# Patient Record
Sex: FEMALE | Race: ASIAN | Employment: UNEMPLOYED | URBAN - METROPOLITAN AREA
[De-identification: names, ages, dates, MRNs, and addresses within clinical notes are randomized per-mention and may not be internally consistent; named-entity substitution may affect disease eponyms.]

---

## 2017-05-14 ENCOUNTER — APPOINTMENT (OUTPATIENT)
Dept: CT IMAGING | Facility: HOSPITAL | Age: 47
End: 2017-05-14
Attending: EMERGENCY MEDICINE

## 2017-05-14 ENCOUNTER — HOSPITAL ENCOUNTER (EMERGENCY)
Facility: HOSPITAL | Age: 47
Discharge: HOME | End: 2017-05-14
Attending: EMERGENCY MEDICINE

## 2017-05-14 VITALS
SYSTOLIC BLOOD PRESSURE: 114 MMHG | RESPIRATION RATE: 18 BRPM | WEIGHT: 163 LBS | DIASTOLIC BLOOD PRESSURE: 66 MMHG | HEART RATE: 77 BPM | TEMPERATURE: 98 F | BODY MASS INDEX: 32 KG/M2 | HEIGHT: 60 IN | OXYGEN SATURATION: 100 %

## 2017-05-14 DIAGNOSIS — D25.9 UTERINE LEIOMYOMA, UNSPECIFIED LOCATION: ICD-10-CM

## 2017-05-14 DIAGNOSIS — R10.9 ABDOMINAL PAIN OF UNKNOWN ETIOLOGY: Primary | ICD-10-CM

## 2017-05-14 PROCEDURE — 80053 COMPREHEN METABOLIC PANEL: CPT | Performed by: EMERGENCY MEDICINE

## 2017-05-14 PROCEDURE — 87086 URINE CULTURE/COLONY COUNT: CPT | Performed by: EMERGENCY MEDICINE

## 2017-05-14 PROCEDURE — 74177 CT ABD & PELVIS W/CONTRAST: CPT | Performed by: EMERGENCY MEDICINE

## 2017-05-14 PROCEDURE — 96374 THER/PROPH/DIAG INJ IV PUSH: CPT

## 2017-05-14 PROCEDURE — 81001 URINALYSIS AUTO W/SCOPE: CPT | Performed by: EMERGENCY MEDICINE

## 2017-05-14 PROCEDURE — 96361 HYDRATE IV INFUSION ADD-ON: CPT

## 2017-05-14 PROCEDURE — 83690 ASSAY OF LIPASE: CPT | Performed by: EMERGENCY MEDICINE

## 2017-05-14 PROCEDURE — 99284 EMERGENCY DEPT VISIT MOD MDM: CPT

## 2017-05-14 PROCEDURE — 81025 URINE PREGNANCY TEST: CPT

## 2017-05-14 PROCEDURE — 85025 COMPLETE CBC W/AUTO DIFF WBC: CPT | Performed by: EMERGENCY MEDICINE

## 2017-05-14 RX ORDER — SODIUM CHLORIDE 9 MG/ML
INJECTION, SOLUTION INTRAVENOUS ONCE
Status: COMPLETED | OUTPATIENT
Start: 2017-05-14 | End: 2017-05-14

## 2017-05-14 RX ORDER — MORPHINE SULFATE 2 MG/ML
2 INJECTION, SOLUTION INTRAMUSCULAR; INTRAVENOUS ONCE
Status: COMPLETED | OUTPATIENT
Start: 2017-05-14 | End: 2017-05-14

## 2017-05-15 NOTE — ED PROVIDER NOTES
Patient Seen in: [facility] Emergency Department    History   Patient presents with:  Abdomen/Flank Pain (GI/)  Back Pain (musculoskeletal)    Stated Complaint: abd and back pain    HPI    51-year-old female here for right flank pain for the last oriented to person, place, and time. No cranial nerve deficit. Skin: Skin is warm and dry. Psychiatric: Normal mood and affect.  Thought content normal.       ED Course     Labs Reviewed   URINALYSIS WITH CULTURE REFLEX - Abnormal; Notable for the follo leiomyoma, unspecified location    Disposition:  Discharge    Follow-up:  Scott Canales MD  [facility]  [phone]    In 3 days        Medications Prescribed:  There are no discharge medications for this patient.

## 2018-03-17 ENCOUNTER — HOSPITAL ENCOUNTER (EMERGENCY)
Facility: HOSPITAL | Age: 48
Discharge: HOME | End: 2018-03-17
Attending: EMERGENCY MEDICINE
Payer: COMMERCIAL

## 2018-03-17 ENCOUNTER — APPOINTMENT (OUTPATIENT)
Dept: ULTRASOUND IMAGING | Facility: HOSPITAL | Age: 48
End: 2018-03-17
Attending: EMERGENCY MEDICINE
Payer: COMMERCIAL

## 2018-03-17 VITALS
HEART RATE: 74 BPM | TEMPERATURE: 98 F | OXYGEN SATURATION: 100 % | DIASTOLIC BLOOD PRESSURE: 70 MMHG | HEIGHT: 64 IN | WEIGHT: 135 LBS | SYSTOLIC BLOOD PRESSURE: 113 MMHG | RESPIRATION RATE: 16 BRPM | BODY MASS INDEX: 23.05 KG/M2

## 2018-03-17 DIAGNOSIS — D25.9 UTERINE LEIOMYOMA, UNSPECIFIED LOCATION: ICD-10-CM

## 2018-03-17 DIAGNOSIS — N92.0 MENORRHAGIA WITH REGULAR CYCLE: Primary | ICD-10-CM

## 2018-03-17 LAB
ALBUMIN SERPL-MCNC: 3.6 G/DL (ref 3.5–4.8)
ALP LIVER SERPL-CCNC: 97 U/L (ref 39–100)
ALT SERPL-CCNC: 13 U/L (ref 14–54)
AST SERPL-CCNC: 14 U/L (ref 15–41)
BASOPHILS # BLD AUTO: 0.07 X10(3) UL (ref 0–0.1)
BASOPHILS NFR BLD AUTO: 1.1 %
BILIRUB SERPL-MCNC: 0.5 MG/DL (ref 0.1–2)
BILIRUB UR QL STRIP.AUTO: NEGATIVE
BUN BLD-MCNC: 8 MG/DL (ref 8–20)
CALCIUM BLD-MCNC: 8.9 MG/DL (ref 8.3–10.3)
CHLORIDE: 110 MMOL/L (ref 101–111)
CO2: 25 MMOL/L (ref 22–32)
CREAT BLD-MCNC: 0.69 MG/DL (ref 0.55–1.02)
EOSINOPHIL # BLD AUTO: 0.24 X10(3) UL (ref 0–0.3)
EOSINOPHIL NFR BLD AUTO: 3.8 %
ERYTHROCYTE [DISTWIDTH] IN BLOOD BY AUTOMATED COUNT: 13.8 % (ref 11.5–16)
GLUCOSE BLD-MCNC: 105 MG/DL (ref 70–99)
GLUCOSE UR STRIP.AUTO-MCNC: NEGATIVE MG/DL
HCT VFR BLD AUTO: 37.5 % (ref 34–50)
HGB BLD-MCNC: 11.7 G/DL (ref 12–16)
IMMATURE GRANULOCYTE COUNT: 0.02 X10(3) UL (ref 0–1)
IMMATURE GRANULOCYTE RATIO %: 0.3 %
LYMPHOCYTES # BLD AUTO: 1.85 X10(3) UL (ref 0.9–4)
LYMPHOCYTES NFR BLD AUTO: 29.1 %
M PROTEIN MFR SERPL ELPH: 7.7 G/DL (ref 6.1–8.3)
MCH RBC QN AUTO: 25.3 PG (ref 27–33.2)
MCHC RBC AUTO-ENTMCNC: 31.2 G/DL (ref 31–37)
MCV RBC AUTO: 81.2 FL (ref 81–100)
MONOCYTES # BLD AUTO: 0.43 X10(3) UL (ref 0.1–1)
MONOCYTES NFR BLD AUTO: 6.8 %
NEUTROPHIL ABS PRELIM: 3.75 X10 (3) UL (ref 1.3–6.7)
NEUTROPHILS # BLD AUTO: 3.75 X10(3) UL (ref 1.3–6.7)
NEUTROPHILS NFR BLD AUTO: 58.9 %
NITRITE UR QL STRIP.AUTO: NEGATIVE
PH UR STRIP.AUTO: 6 [PH] (ref 4.5–8)
PLATELET # BLD AUTO: 307 10(3)UL (ref 150–450)
POCT LOT NUMBER: NORMAL
POCT URINE PREGNANCY: NEGATIVE
POTASSIUM SERPL-SCNC: 3.7 MMOL/L (ref 3.6–5.1)
PROCEDURE CONTROL: YES
PROT UR STRIP.AUTO-MCNC: 100 MG/DL
RBC # BLD AUTO: 4.62 X10(6)UL (ref 3.8–5.1)
RBC #/AREA URNS AUTO: >10 /HPF
RED CELL DISTRIBUTION WIDTH-SD: 40.2 FL (ref 35.1–46.3)
SODIUM SERPL-SCNC: 141 MMOL/L (ref 136–144)
SP GR UR STRIP.AUTO: 1.01 (ref 1–1.03)
TSI SER-ACNC: 4.36 MIU/ML (ref 0.35–5.5)
UROBILINOGEN UR STRIP.AUTO-MCNC: <2 MG/DL
WBC # BLD AUTO: 6.4 X10(3) UL (ref 4–13)
WBC #/AREA URNS AUTO: >50 /HPF
YEAST URINE: PRESENT

## 2018-03-17 PROCEDURE — 81001 URINALYSIS AUTO W/SCOPE: CPT | Performed by: EMERGENCY MEDICINE

## 2018-03-17 PROCEDURE — 85025 COMPLETE CBC W/AUTO DIFF WBC: CPT

## 2018-03-17 PROCEDURE — 80053 COMPREHEN METABOLIC PANEL: CPT | Performed by: EMERGENCY MEDICINE

## 2018-03-17 PROCEDURE — 76856 US EXAM PELVIC COMPLETE: CPT | Performed by: EMERGENCY MEDICINE

## 2018-03-17 PROCEDURE — 80050 GENERAL HEALTH PANEL: CPT | Performed by: EMERGENCY MEDICINE

## 2018-03-17 PROCEDURE — 87086 URINE CULTURE/COLONY COUNT: CPT | Performed by: EMERGENCY MEDICINE

## 2018-03-17 PROCEDURE — 81025 URINE PREGNANCY TEST: CPT

## 2018-03-17 PROCEDURE — 96374 THER/PROPH/DIAG INJ IV PUSH: CPT

## 2018-03-17 PROCEDURE — 99285 EMERGENCY DEPT VISIT HI MDM: CPT

## 2018-03-17 PROCEDURE — 80053 COMPREHEN METABOLIC PANEL: CPT

## 2018-03-17 PROCEDURE — 93975 VASCULAR STUDY: CPT | Performed by: EMERGENCY MEDICINE

## 2018-03-17 PROCEDURE — 85025 COMPLETE CBC W/AUTO DIFF WBC: CPT | Performed by: EMERGENCY MEDICINE

## 2018-03-17 PROCEDURE — 99284 EMERGENCY DEPT VISIT MOD MDM: CPT

## 2018-03-17 PROCEDURE — 84443 ASSAY THYROID STIM HORMONE: CPT | Performed by: EMERGENCY MEDICINE

## 2018-03-17 PROCEDURE — 76830 TRANSVAGINAL US NON-OB: CPT | Performed by: EMERGENCY MEDICINE

## 2018-03-17 RX ORDER — ACETAMINOPHEN AND CODEINE PHOSPHATE 300; 30 MG/1; MG/1
1-2 TABLET ORAL EVERY 4 HOURS PRN
Qty: 10 TABLET | Refills: 0 | Status: SHIPPED | OUTPATIENT
Start: 2018-03-17 | End: 2018-03-24

## 2018-03-17 RX ORDER — KETOROLAC TROMETHAMINE 30 MG/ML
30 INJECTION, SOLUTION INTRAMUSCULAR; INTRAVENOUS ONCE
Status: COMPLETED | OUTPATIENT
Start: 2018-03-17 | End: 2018-03-17

## 2018-03-17 NOTE — ED INITIAL ASSESSMENT (HPI)
Pt states last period was in sept it was normal, this is first time having a period since than, this is second day and bad cramping and heavy bleeding, pt has not followed up with a gyne

## 2018-03-17 NOTE — ED PROVIDER NOTES
Patient Seen in: [facility] Emergency Department    History   Patient presents with:  Eval-G (gynecologic)    Stated Complaint: Eval - G, abd cramping    Lower abdominal cramping and painful periods            Patient presents to the emergency depar well-nourished. No distress. HENT:   Head: Atraumatic.    Right Ear: External ear normal.   Left Ear: External ear normal.   Nose: Nose normal.   Mouth/Throat: Oropharynx is clear and moist.   Eyes: Conjunctivae and EOM are normal. Pupils are equal, round CULTURE REFLEX   RAINBOW DRAW BLUE   RAINBOW DRAW LAVENDER   RAINBOW DRAW LIGHT GREEN   RAINBOW DRAW GOLD       ED Course as of Mar 17 1320  ------------------------------------------------------------  US PELVIS EV W DOPPLER (FAY=[id number])+(VUI [id number]) DOPPLER WAVE FORMS    FLOW:  There is normal arterial and venous Doppler wave forms in both     ovaries. The spectral analysis is within normal limits.     OTHER:  Negative.         =====    CONCLUSION:  There are 2 dominant fibroids noted within the u location    Disposition:  Discharge  3/17/2018  1:20 pm    Follow-up:  Marilin Lazar, [address] [place] [address]  [phone]    Schedule an appointment as soon as possible for a visit          Medications Prescribed:  Current

## 2018-03-18 RX ORDER — FLUCONAZOLE 150 MG/1
150 TABLET ORAL DAILY
Qty: 2 TABLET | Refills: 0 | Status: SHIPPED | OUTPATIENT
Start: 2018-03-18 | End: 2018-03-22 | Stop reason: ALTCHOICE

## 2018-03-18 RX ORDER — CEPHALEXIN 500 MG/1
500 CAPSULE ORAL 3 TIMES DAILY
Qty: 15 CAPSULE | Refills: 0 | Status: SHIPPED | OUTPATIENT
Start: 2018-03-18 | End: 2018-03-22 | Stop reason: ALTCHOICE

## 2018-03-18 NOTE — ED NOTES
Per Dr Shama Wilson, pt son Ayden Roberson called at [phone] to inform that 2 RXs have been written for pt's Mom. Ayden Roberson requests to call in RXs to [facility] ([phone]). Number provided by Ayden Roberson.  Prescriptions were called in by this RN

## 2018-03-20 ENCOUNTER — OFFICE VISIT (OUTPATIENT)
Dept: OBGYN CLINIC | Facility: CLINIC | Age: 48
End: 2018-03-20

## 2018-03-20 VITALS
HEART RATE: 88 BPM | BODY MASS INDEX: 28.86 KG/M2 | DIASTOLIC BLOOD PRESSURE: 60 MMHG | HEIGHT: 60 IN | WEIGHT: 147 LBS | SYSTOLIC BLOOD PRESSURE: 130 MMHG | RESPIRATION RATE: 16 BRPM

## 2018-03-20 DIAGNOSIS — Z12.39 BREAST CANCER SCREENING: ICD-10-CM

## 2018-03-20 DIAGNOSIS — N92.1 MENOMETRORRHAGIA: ICD-10-CM

## 2018-03-20 DIAGNOSIS — Z01.419 ENCOUNTER FOR WELL WOMAN EXAM WITH ROUTINE GYNECOLOGICAL EXAM: Primary | ICD-10-CM

## 2018-03-20 DIAGNOSIS — Z12.4 CERVICAL CANCER SCREENING: ICD-10-CM

## 2018-03-20 PROCEDURE — 99386 PREV VISIT NEW AGE 40-64: CPT | Performed by: OBSTETRICS & GYNECOLOGY

## 2018-03-20 PROCEDURE — 99203 OFFICE O/P NEW LOW 30 MIN: CPT | Performed by: OBSTETRICS & GYNECOLOGY

## 2018-03-20 PROCEDURE — 88175 CYTOPATH C/V AUTO FLUID REDO: CPT | Performed by: OBSTETRICS & GYNECOLOGY

## 2018-03-20 PROCEDURE — 87624 HPV HI-RISK TYP POOLED RSLT: CPT | Performed by: OBSTETRICS & GYNECOLOGY

## 2018-03-21 ENCOUNTER — TELEPHONE (OUTPATIENT)
Dept: OBGYN CLINIC | Facility: CLINIC | Age: 48
End: 2018-03-21

## 2018-03-21 ENCOUNTER — HOSPITAL ENCOUNTER (OUTPATIENT)
Dept: MAMMOGRAPHY | Facility: HOSPITAL | Age: 48
Discharge: HOME | End: 2018-03-21
Attending: OBSTETRICS & GYNECOLOGY
Payer: COMMERCIAL

## 2018-03-21 DIAGNOSIS — Z12.39 BREAST CANCER SCREENING: ICD-10-CM

## 2018-03-21 LAB — HPV I/H RISK 1 DNA SPEC QL NAA+PROBE: NEGATIVE

## 2018-03-21 PROCEDURE — 77067 SCR MAMMO BI INCL CAD: CPT | Performed by: OBSTETRICS & GYNECOLOGY

## 2018-03-21 PROCEDURE — 77063 BREAST TOMOSYNTHESIS BI: CPT | Performed by: OBSTETRICS & GYNECOLOGY

## 2018-03-21 NOTE — TELEPHONE ENCOUNTER
Per pt she has some questions about the options that were given by dr SAPP. Please call and advise. Her son is in the hipaa list, you can talk to him.  Thanks

## 2018-03-21 NOTE — TELEPHONE ENCOUNTER
Patient son called to inform office that his mom would like to proceed with a Novasure ablation. Will call patient back tomorrow with available surgery dates.

## 2018-03-22 ENCOUNTER — TELEPHONE (OUTPATIENT)
Dept: OBGYN CLINIC | Facility: CLINIC | Age: 48
End: 2018-03-22

## 2018-03-22 NOTE — TELEPHONE ENCOUNTER
Via webportal for patient's insurance - [employer] - determined prior Niranjan Pour is not required for upcoming procedure 12229 hysteroscopy/D&C/endometrial ablation.

## 2018-03-28 NOTE — PROGRESS NOTES
Called and spoke with patients son, ok per hippa, and informed of normal pap results. Pts son expressed understanding.

## 2018-04-03 ENCOUNTER — ANESTHESIA (OUTPATIENT)
Dept: SURGERY | Facility: HOSPITAL | Age: 48
End: 2018-04-03
Payer: COMMERCIAL

## 2018-04-03 ENCOUNTER — SURGERY (OUTPATIENT)
Age: 48
End: 2018-04-03

## 2018-04-03 ENCOUNTER — ANESTHESIA EVENT (OUTPATIENT)
Dept: SURGERY | Facility: HOSPITAL | Age: 48
End: 2018-04-03
Payer: COMMERCIAL

## 2018-04-03 ENCOUNTER — HOSPITAL ENCOUNTER (OUTPATIENT)
Facility: HOSPITAL | Age: 48
Setting detail: HOSPITAL OUTPATIENT SURGERY
Discharge: HOME | End: 2018-04-03
Attending: OBSTETRICS & GYNECOLOGY | Admitting: OBSTETRICS & GYNECOLOGY
Payer: COMMERCIAL

## 2018-04-03 VITALS
TEMPERATURE: 99 F | BODY MASS INDEX: 28.66 KG/M2 | HEART RATE: 64 BPM | OXYGEN SATURATION: 100 % | WEIGHT: 146 LBS | SYSTOLIC BLOOD PRESSURE: 113 MMHG | HEIGHT: 60 IN | RESPIRATION RATE: 16 BRPM | DIASTOLIC BLOOD PRESSURE: 84 MMHG

## 2018-04-03 PROCEDURE — 0UDB8ZZ EXTRACTION OF ENDOMETRIUM, VIA NATURAL OR ARTIFICIAL OPENING ENDOSCOPIC: ICD-10-PCS | Performed by: OBSTETRICS & GYNECOLOGY

## 2018-04-03 PROCEDURE — 58563 HYSTEROSCOPY ABLATION: CPT | Performed by: OBSTETRICS & GYNECOLOGY

## 2018-04-03 PROCEDURE — 0U5B8ZZ DESTRUCTION OF ENDOMETRIUM, VIA NATURAL OR ARTIFICIAL OPENING ENDOSCOPIC: ICD-10-PCS | Performed by: OBSTETRICS & GYNECOLOGY

## 2018-04-03 RX ORDER — ONDANSETRON 2 MG/ML
4 INJECTION INTRAMUSCULAR; INTRAVENOUS AS NEEDED
Status: DISCONTINUED | OUTPATIENT
Start: 2018-04-03 | End: 2018-04-03

## 2018-04-03 RX ORDER — HYDROCODONE BITARTRATE AND ACETAMINOPHEN 5; 325 MG/1; MG/1
1 TABLET ORAL AS NEEDED
Status: DISCONTINUED | OUTPATIENT
Start: 2018-04-03 | End: 2018-04-03

## 2018-04-03 RX ORDER — NALOXONE HYDROCHLORIDE 0.4 MG/ML
80 INJECTION, SOLUTION INTRAMUSCULAR; INTRAVENOUS; SUBCUTANEOUS AS NEEDED
Status: DISCONTINUED | OUTPATIENT
Start: 2018-04-03 | End: 2018-04-03

## 2018-04-03 RX ORDER — SODIUM CHLORIDE, SODIUM LACTATE, POTASSIUM CHLORIDE, CALCIUM CHLORIDE 600; 310; 30; 20 MG/100ML; MG/100ML; MG/100ML; MG/100ML
INJECTION, SOLUTION INTRAVENOUS CONTINUOUS
Status: DISCONTINUED | OUTPATIENT
Start: 2018-04-03 | End: 2018-04-03

## 2018-04-03 RX ORDER — HYDROCODONE BITARTRATE AND ACETAMINOPHEN 5; 325 MG/1; MG/1
1 TABLET ORAL EVERY 6 HOURS PRN
Qty: 5 TABLET | Refills: 0 | Status: SHIPPED | OUTPATIENT
Start: 2018-04-03

## 2018-04-03 RX ORDER — HYDROCODONE BITARTRATE AND ACETAMINOPHEN 5; 325 MG/1; MG/1
2 TABLET ORAL AS NEEDED
Status: DISCONTINUED | OUTPATIENT
Start: 2018-04-03 | End: 2018-04-03

## 2018-04-03 NOTE — ANESTHESIA PREPROCEDURE EVALUATION
PRE-OP EVALUATION    Patient Name: [facility] AND [facility]    Pre-op Diagnosis: PELVIC PAIN, HEAVY BLEEDING     Procedure(s):   HYSTEROSCOPY/DILATION AND CURETTAGE, ENDOMETRIAL ABLATION    Surgeon(s) and Role:     * Promise Petit DO - Primary    Pre-op vi Cardiovascular      Rhythm: regular  Rate: normal     Dental    No notable dental history. Pulmonary      Breath sounds clear to auscultation bilaterally.                Other findings            ASA: 1   Plan: MAC  NPO status verified and patient m

## 2018-04-03 NOTE — OPERATIVE REPORT
Pre-op Dx: Menometrorrhagia  Post-op Dx: same  Surgeon:   Procedure: Hysteroscopy D&C, novasure ablation  Complications: none  Findings: 10 cm uterus, 5 cm - length , 4.2 cm width, no intrauterine lesions  Procedure note: Pt was taken to the O. R

## 2018-04-03 NOTE — ANESTHESIA POSTPROCEDURE EVALUATION
[address] [facility] Patient Status:  Hospital Outpatient Surgery   Age/Gender 52year old female MRN [id number]   [facility] SURGERY Attending Sarai Fang, [address] [facility] Day # 0 PCP Glenn Enrique MD       Anesthesia Post-o

## 2018-04-03 NOTE — H&P
Guille Nam is a 52year old female [id number] Patient's last menstrual period was 03/15/2018 (approximate). No chief complaint on file. .  Patient c/o very painful, long heavy menses, desires novasure ablation.     OBSTETRICS HISTORY:  OB History Neurological:  denies headaches, extremity weakness or numbness. Psychiatric: denies depression or anxiety. Endocrine:   denies excessive thirst or urination. Heme/Lymph:  denies history of anemia, easy bruising or bleeding.       PHYSICAL EXAM:   Cons

## 2019-04-18 ENCOUNTER — TELEPHONE (OUTPATIENT)
Dept: OBGYN CLINIC | Facility: CLINIC | Age: 49
End: 2019-04-18

## 2019-04-18 NOTE — TELEPHONE ENCOUNTER
Per pt request .records was faxed to [address] @ [phone].A # [phone]. [address]  [facility].[address] [place] [address].

## 2019-07-22 ENCOUNTER — TELEPHONE (OUTPATIENT)
Dept: OBGYN CLINIC | Facility: CLINIC | Age: 49
End: 2019-07-22

## 2019-07-22 DIAGNOSIS — Z12.31 ENCOUNTER FOR SCREENING MAMMOGRAM FOR BREAST CANCER: Primary | ICD-10-CM

## 2019-07-22 NOTE — TELEPHONE ENCOUNTER
PT would like order for a mammogram. Leaving the state in two weeks and needs before.   Please advise

## 2022-02-11 NOTE — PROGRESS NOTES
Tamia Gamble is a 52year old female [id number] Patient's last menstrual period was 03/15/2018 (approximate). Patient presents with:  Er F/u: new pt.  seen in er 03/17/2018. abd. pain. discuss recent US results.   .  Patient had period in September [address]  Cardiovascular:  denies chest pain or palpitations  Respiratory:  denies shortness of breath  Gastrointestinal:  denies heartburn, abdominal pain, diarrhea or constipation  Genitourinary:  denies dysuria, incontinence, abnormal vaginal disch answered, patients daughter present and translate.    Novasure and Mirena IUD info given, patient will consider and call us back    Assessment & Plan:  Diagnoses and all orders for this visit:    Encounter for well woman exam with routine gynecological exam no vomiting.

## 2024-08-29 ENCOUNTER — HOSPITAL ENCOUNTER (OUTPATIENT)
Age: 54
Discharge: HOME | End: 2024-08-29
Payer: MEDICAID

## 2024-08-29 VITALS
WEIGHT: 162.5 LBS | HEIGHT: 60 IN | SYSTOLIC BLOOD PRESSURE: 131 MMHG | OXYGEN SATURATION: 100 % | HEART RATE: 78 BPM | DIASTOLIC BLOOD PRESSURE: 68 MMHG | RESPIRATION RATE: 18 BRPM | TEMPERATURE: 98 F | BODY MASS INDEX: 31.9 KG/M2

## 2024-08-29 DIAGNOSIS — J02.8 VIRAL SORE THROAT: Primary | ICD-10-CM

## 2024-08-29 DIAGNOSIS — B97.89 VIRAL SORE THROAT: Primary | ICD-10-CM

## 2024-08-29 LAB
S PYO AG THROAT QL IA.RAPID: NEGATIVE
SARS-COV-2 RNA RESP QL NAA+PROBE: NOT DETECTED

## 2024-08-29 PROCEDURE — 99204 OFFICE O/P NEW MOD 45 MIN: CPT

## 2024-08-29 PROCEDURE — 87651 STREP A DNA AMP PROBE: CPT | Performed by: NURSE PRACTITIONER

## 2024-08-29 PROCEDURE — 99203 OFFICE O/P NEW LOW 30 MIN: CPT

## 2024-08-29 RX ORDER — DEXAMETHASONE SODIUM PHOSPHATE 10 MG/ML
10 INJECTION, SOLUTION INTRAMUSCULAR; INTRAVENOUS ONCE
Status: COMPLETED | OUTPATIENT
Start: 2024-08-29 | End: 2024-08-29

## 2024-08-29 NOTE — DISCHARGE INSTRUCTIONS
Warm salt water gargles and throat lozenges.  Tylenol and ibuprofen.  Follow-up with your PCP as needed.

## 2024-08-29 NOTE — ED PROVIDER NOTES
Patient Seen in: [facility]      History     Chief Complaint   Patient presents with    Sore Throat            Stated Complaint: Throat issue    Subjective:   This is a 53-year-old female with below stated medical history.  Presents to immediate care for sore throat and headache.  Symptoms ongoing for the last 2 days.  She denies any nasal congestion, rhinorrhea, or coughing.  No voice change or stridor.  Taking Tylenol with some relief.     The history is provided by the patient and the spouse.           Objective:   Past Medical History:    H/O screening mammography    Negative/[facility] PHARMA    Visual impairment    GLASSES              Past Surgical History:   Procedure Laterality Date    Hysteroscopy      Needle biopsy right  03/2015    US core bx -benign                Social History     Socioeconomic History    Marital status:    Tobacco Use    Smoking status: Never    Smokeless tobacco: Never   Substance and Sexual Activity    Alcohol use: No    Drug use: No     Social Determinants of Health      Received from [facility], [facility]    [facility] [address] [facility]              Review of Systems   Constitutional:  Negative for chills and fever.   HENT:  Positive for sore throat. Negative for congestion.    Respiratory:  Negative for cough.    Cardiovascular:  Negative for chest pain, palpitations and leg swelling.   Gastrointestinal:  Negative for abdominal pain.   Genitourinary:  Negative for dysuria.   Musculoskeletal:  Negative for back pain, neck pain and neck stiffness.   Skin:  Negative for rash.   Neurological:  Positive for headaches.   Hematological:  Does not bruise/bleed easily.       Positive for stated Chief Complaint: Sore Throat (/)    Other systems are as noted in HPI.  Constitutional and vital signs reviewed.      All other systems reviewed and negative except as noted above.    Physical Exam     ED Triage Vitals [08/29/24 1357]   /68   Pulse 78   Resp 18   Temp 98.3 °F (36.8  °C)   Temp src Temporal   SpO2 100 %   O2 Device None (Room air)       Current Vitals:   Vital Signs  BP: 131/68  Pulse: 78  Resp: 18  Temp: 98.3 °F (36.8 °C)  Temp src: Temporal    Oxygen Therapy  SpO2: 100 %  O2 Device: None (Room air)            Physical Exam  Vitals and nursing note reviewed.   Constitutional:       General: She is not in acute distress.     Appearance: Normal appearance. She is well-developed. She is obese. She is not ill-appearing, toxic-appearing or diaphoretic.   HENT:      Head: Normocephalic and atraumatic.      Right Ear: Tympanic membrane, ear canal and external ear normal. No drainage, swelling or tenderness. No middle ear effusion. Tympanic membrane is not erythematous.      Left Ear: Tympanic membrane, ear canal and external ear normal. No drainage, swelling or tenderness.  No middle ear effusion. Tympanic membrane is not erythematous.      Nose: Nose normal. No congestion or rhinorrhea.      Mouth/Throat:      Mouth: Mucous membranes are moist. No oral lesions.      Pharynx: Oropharynx is clear. Uvula midline. No pharyngeal swelling, oropharyngeal exudate, posterior oropharyngeal erythema or uvula swelling.      Tonsils: No tonsillar exudate or tonsillar abscesses. 1+ on the right. 1+ on the left.   Eyes:      General:         Right eye: No discharge.         Left eye: No discharge.      Extraocular Movements: Extraocular movements intact.      Conjunctiva/sclera: Conjunctivae normal.   Cardiovascular:      Rate and Rhythm: Normal rate and regular rhythm.      Heart sounds: Normal heart sounds. No murmur heard.  Pulmonary:      Effort: Pulmonary effort is normal. No respiratory distress.      Breath sounds: Normal breath sounds. No stridor. No wheezing, rhonchi or rales.   Musculoskeletal:      Cervical back: Neck supple.      Right lower leg: No edema.      Left lower leg: No edema.   Skin:     General: Skin is warm and dry.      Capillary Refill: Capillary refill takes less than 2  seconds.      Findings: No rash.   Neurological:      Mental Status: She is alert and oriented to person, place, and time.   Psychiatric:         Mood and Affect: Mood normal.         Behavior: Behavior normal.               ED Course     Labs Reviewed   RAPID STREP A - Normal   RAPID SARS-COV-2 BY PCR - Normal             Rapid strep, rapid COVID         MDM        Vital signs stable.  Patient is well-appearing and nontoxic looking.  Presents to immediate care for localized sore throat and headache.    Differential diagnosis include but is not limited to strep, viral pharyngitis, COVID, tonsillitis, less likely PTA    Posterior pharynx is erythemic with bilateral tonsillar swelling +1.  Tonsils stones bilaterally.  No evidence of exudates or PTA.  Rapid strep and rapid COVID are negative.    Clinical impression is viral pharyngitis    1 dose of Decadron given here in the clinic.  DC home.  Symptomatic and supportive care discussed.  Recommended PCP follow-up as needed.  Patient and her  verbalized understanding, and agreed with plan of care.  All questions were answered.                                       Medical Decision Making      Disposition and Plan     Clinical Impression:  1. Viral sore throat         Disposition:  Discharge  8/29/2024  2:30 pm    Follow-up:  Roxanna Saldana MD  [address] [address] [phone]    In 1 week            Medications Prescribed:  Current Discharge Medication List

## 2024-08-29 NOTE — ED INITIAL ASSESSMENT (HPI)
Sore throat for past two days, lowe grade fever. Denies coughing, congestion, or runny nose. Had a headache last night.

## 2024-09-28 ENCOUNTER — APPOINTMENT (OUTPATIENT)
Dept: GENERAL RADIOLOGY | Age: 54
End: 2024-09-28
Attending: EMERGENCY MEDICINE
Payer: MEDICAID

## 2024-09-28 ENCOUNTER — HOSPITAL ENCOUNTER (OUTPATIENT)
Age: 54
Discharge: HOME | End: 2024-09-28
Attending: EMERGENCY MEDICINE
Payer: MEDICAID

## 2024-09-28 VITALS
OXYGEN SATURATION: 100 % | TEMPERATURE: 100 F | WEIGHT: 160.94 LBS | HEART RATE: 89 BPM | SYSTOLIC BLOOD PRESSURE: 112 MMHG | RESPIRATION RATE: 16 BRPM | HEIGHT: 60 IN | DIASTOLIC BLOOD PRESSURE: 71 MMHG | BODY MASS INDEX: 31.6 KG/M2

## 2024-09-28 DIAGNOSIS — R50.9 FEVER, UNSPECIFIED FEVER CAUSE: Primary | ICD-10-CM

## 2024-09-28 DIAGNOSIS — N30.00 ACUTE CYSTITIS WITHOUT HEMATURIA: ICD-10-CM

## 2024-09-28 LAB
#MXD IC: 0.8 X10ˆ3/UL (ref 0.1–1)
BILIRUB UR QL STRIP: NEGATIVE
BUN BLD-MCNC: 8 MG/DL (ref 7–18)
CHLORIDE BLD-SCNC: 104 MMOL/L (ref 98–112)
CLARITY UR: CLEAR
CO2 BLD-SCNC: 26 MMOL/L (ref 21–32)
COLOR UR: YELLOW
CREAT BLD-MCNC: 0.7 MG/DL
EGFRCR SERPLBLD CKD-EPI 2021: 103 ML/MIN/1.73M2 (ref 60–?)
GLUCOSE BLD-MCNC: 103 MG/DL (ref 70–99)
GLUCOSE UR STRIP-MCNC: NEGATIVE MG/DL
HCT VFR BLD AUTO: 34.8 %
HCT VFR BLD CALC: 33 %
HGB BLD-MCNC: 10.4 G/DL
ISTAT IONIZED CALCIUM FOR CHEM 8: 1.22 MMOL/L (ref 1.12–1.32)
KETONES UR STRIP-MCNC: NEGATIVE MG/DL
LYMPHOCYTES # BLD AUTO: 2.7 X10ˆ3/UL (ref 1–4)
LYMPHOCYTES NFR BLD AUTO: 35.4 %
MCH RBC QN AUTO: 23.6 PG (ref 26–34)
MCHC RBC AUTO-ENTMCNC: 29.9 G/DL (ref 31–37)
MCV RBC AUTO: 79.1 FL (ref 80–100)
MIXED CELL %: 10.3 %
NEUTROPHILS # BLD AUTO: 4 X10ˆ3/UL (ref 1.5–7.7)
NEUTROPHILS NFR BLD AUTO: 54.3 %
NITRITE UR QL STRIP: NEGATIVE
PH UR STRIP: 6.5 [PH]
PLATELET # BLD AUTO: 350 X10ˆ3/UL (ref 150–450)
POCT INFLUENZA A: NEGATIVE
POCT INFLUENZA B: NEGATIVE
POTASSIUM BLD-SCNC: 4 MMOL/L (ref 3.6–5.1)
PROT UR STRIP-MCNC: NEGATIVE MG/DL
RBC # BLD AUTO: 4.4 X10ˆ6/UL
S PYO AG THROAT QL IA.RAPID: NEGATIVE
SARS-COV-2 RNA RESP QL NAA+PROBE: NOT DETECTED
SODIUM BLD-SCNC: 140 MMOL/L (ref 136–145)
SP GR UR STRIP: 1.02
UROBILINOGEN UR STRIP-ACNC: <2 MG/DL
WBC # BLD AUTO: 7.5 X10ˆ3/UL (ref 4–11)

## 2024-09-28 PROCEDURE — 87502 INFLUENZA DNA AMP PROBE: CPT | Performed by: EMERGENCY MEDICINE

## 2024-09-28 PROCEDURE — 80047 BASIC METABLC PNL IONIZED CA: CPT

## 2024-09-28 PROCEDURE — 99214 OFFICE O/P EST MOD 30 MIN: CPT

## 2024-09-28 PROCEDURE — 81002 URINALYSIS NONAUTO W/O SCOPE: CPT

## 2024-09-28 PROCEDURE — 87086 URINE CULTURE/COLONY COUNT: CPT | Performed by: EMERGENCY MEDICINE

## 2024-09-28 PROCEDURE — 36415 COLL VENOUS BLD VENIPUNCTURE: CPT

## 2024-09-28 PROCEDURE — 87651 STREP A DNA AMP PROBE: CPT | Performed by: EMERGENCY MEDICINE

## 2024-09-28 PROCEDURE — 71046 X-RAY EXAM CHEST 2 VIEWS: CPT | Performed by: EMERGENCY MEDICINE

## 2024-09-28 PROCEDURE — 85025 COMPLETE CBC W/AUTO DIFF WBC: CPT | Performed by: EMERGENCY MEDICINE

## 2024-09-28 RX ORDER — CEFADROXIL 500 MG/1
500 CAPSULE ORAL 2 TIMES DAILY
Qty: 20 CAPSULE | Refills: 0 | Status: SHIPPED | OUTPATIENT
Start: 2024-09-28 | End: 2024-10-08

## 2024-09-28 NOTE — DISCHARGE INSTRUCTIONS
Take your temperature daily in the evening hours and keep a diary of the values for your primary care physician.

## 2024-09-28 NOTE — ED PROVIDER NOTES
Patient Seen in: [facility]      History     Chief Complaint   Patient presents with    Sore Throat     Stated Complaint: sore throat/fever    Subjective:   HPI    53-year-old female comes to the immediate care with her daughter for evaluation of fevers for a month.  The patient was seen in the immediate care 4 weeks ago with a fever and a sore throat.  Strep and COVID were negative at that time.  She followed with with her primary care physician the following week and was treated with antibiotics because she was continuing to complain of throat pain.  Since that time her daughter states that she has had fevers every night although no one is taking her temperature she has been using ibuprofen.  She has had some bodyaches.  No headache, ear or throat pain, cough or difficulty breathing, vomiting or diarrhea.  She has not sought additional follow-up with her primary care physician since the initial visit more than 3 weeks ago.    Objective:   Past Medical History:    H/O screening mammography    Negative/[employer]    Visual impairment    GLASSES              Past Surgical History:   Procedure Laterality Date    Hysteroscopy      Needle biopsy right  03/2015    US core bx -benign                Social History     Socioeconomic History    Marital status:    Tobacco Use    Smoking status: Never    Smokeless tobacco: Never   Substance and Sexual Activity    Alcohol use: No    Drug use: No     Social Determinants of Health      [facility]              Review of Systems    Positive for stated Chief Complaint: Sore Throat    Other systems are as noted in HPI.  Constitutional and vital signs reviewed.      All other systems reviewed and negative except as noted above.    Physical Exam     ED Triage Vitals [09/28/24 1205]   /51   Pulse 100   Resp 18   Temp 99.5 °F (37.5 °C)   Temp src Oral   SpO2 100 %   O2 Device None (Room air)       Current  Vitals:   Vital Signs  BP: 112/51  Pulse: 100  Resp: 18  Temp: 99.5 °F (37.5 °C)  Temp src: Oral    Oxygen Therapy  SpO2: 100 %  O2 Device: None (Room air)            Physical Exam    General Appearance: This is a middle-aged female sitting on a gurney.  Vital signs were reviewed per nurses notes.  Temperature is 99.5.  Pulse oximetry is 100% on room air.  Heart rate and blood pressure within normal range.  HEENT: Normocephalic/atraumatic.  Anicteric sclera.  Oral mucosa is moist.  Neck: No adenopathy or thyromegaly.  No hoarseness or stridor.  Lungs are clear to auscultation.  Heart exam: Normal S1-S2 without extra sounds or murmurs.  Regular rate and rhythm.  Abdomen is nontender.  Skin is dry without rashes or lesions.  Extremities are atraumatic.  Neuroexam: Awake, conversive and moving all 4 extremities well.    ED Course     Labs Reviewed   POCT CBC - Abnormal; Notable for the following components:       Result Value    HGB IC 10.4 (*)     HCT IC 34.8 (*)     MCV IC 79.1 (*)     MCH IC 23.6 (*)     MCHC IC 29.9 (*)     All other components within normal limits   EM POCT URINALYSIS DIPSTICK - Abnormal; Notable for the following components:    Blood, Urine Trace-Intact (*)     Leukocyte esterase urine Small (*)     All other components within normal limits   POCT ISTAT CHEM8 CARTRIDGE - Abnormal; Notable for the following components:    ISTAT Hematocrit 33 (*)     ISTAT Glucose 103 (*)     All other components within normal limits   RAPID SARS-COV-2 BY PCR - Normal   POCT FLU TEST - Normal    Narrative:     This assay is a rapid molecular in vitro test utilizing nucleic acid amplification of influenza A and B viral RNA.   RAPID STREP A - Normal   URINE CULTURE, ROUTINE             Discussed limitations of testing in the immediate care.    XR CHEST PA + LAT CHEST (CPT=71046)    Result Date: 9/28/2024  PROCEDURE:  XR CHEST PA + LAT CHEST (CPT=71046)  INDICATIONS:  sore throat/fever  COMPARISON:  None.  TECHNIQUE:   PA and lateral chest radiographs were obtained.  PATIENT STATED HISTORY: (As transcribed by Technologist)  Patient states she has had a sore throat and fever for three weeks.    FINDINGS:  LUNGS:  No focal consolidation.  Normal vascularity. CARDIAC:  Normal size cardiac silhouette. MEDIASTINUM:  Normal. PLEURA:  Normal.  No pleural effusions. BONES:  Normal for age.            CONCLUSION:  There is no evidence of active cardiopulmonary disease.   LOCATION:  [place]   Dictated by (CST): Pavan Ng MD on 9/28/2024 at 1:25 PM     Finalized by (CST): Pavan Ng MD on 9/28/2024 at 1:25 PM       I personally reviewed the images myself and went over results with patient.    I reviewed the chest x-ray films myself and there is no acute cardiopulmonary disease.    Test results and treatment plan were discussed.         MDM      #1.  Fever.  Tactile fevers for a month although none documented.  Recommended that patient check temperature and document keeping a log for her primary care physician on a daily basis.  2.  Acute cystitis.  Suspicion of urinary tract infection.  Urine culture was sent.  Discharged home on a 10-day course of a cephalosporin                                   Medical Decision Making      Disposition and Plan     Clinical Impression:  1. Fever, unspecified fever cause    2. Acute cystitis without hematuria         Disposition:  Discharge  9/28/2024  1:25 pm    Follow-up:  Funmilayo Mixon  [address] [place] [address] [phone]    Call in 2 days            Medications Prescribed:  Current Discharge Medication List        START taking these medications    Details   cefadroxil 500 MG Oral Cap Take 1 capsule (500 mg total) by mouth 2 (two) times daily for 10 days.  Qty: 20 capsule, Refills: 0

## 2024-10-01 NOTE — [place] NOTES
Called pt.   Notified of neg culture.   Pt denies any urinary symptoms.   Advised to stop taking antibiotic.   Daughter states pt still having a fever, and notified pt to be reevalutated.   She states she did talk to PCP office and advised to follow back with IC.   Notified pt to follow up as directed by pcp.   Verbalized understanding.   [FreeTextEntry1] : Using sterile instrumentation debridement of all nails manually and electrically to decrease thickness, pain and girth and make shoe gear more comfortable with "slant back" procedure of any bordering spicules causing pain\par follow up prn\par

## (undated) DEVICE — GYN CDS: Brand: MEDLINE INDUSTRIES, INC.

## (undated) DEVICE — TUBING CYSTO

## (undated) DEVICE — SOL  .9 1000ML BTL

## (undated) DEVICE — KENDALL SCD EXPRESS SLEEVES, KNEE LENGTH, MEDIUM: Brand: KENDALL SCD

## (undated) DEVICE — GLOVE SURG SENSICARE SZ 6-1/2

## (undated) DEVICE — UTERINE ABLATOR NOVASURE

## (undated) DEVICE — SOL  .9 3000ML

## (undated) NOTE — LETTER
[address] Removal Waiver:    I hereby acknowledge that [facility] staff attempted the following interventions to remove my jewelry:     q  Elevate extremity    q  Ice extremity    q  Use soap & water    q  Use lotions or lubricant    q Griselda Gills Hemant Solanki   :  10/12/1970  MRN:  [id number]  CSN:  [id number]  Candido Hale Do  [facility] PRE-OP / [facility]

## (undated) NOTE — ED AVS SNAPSHOT
[facility] Emergency Department    [place]  [address] [address]    Phone:  [phone]    Fax:  [phone]           Sarah Evanss   MRN: [id number]    Department:  [facility] Emergency Department   Date of Visit: [phone]       To Check ER Wait Times:  TEXT 'ERwait' to 18904      Click [email or web address]      Or call [phone]    If you have any problems with your follow-up, please call our  at [phone]    Graeme salinas con I have read and understand the instructions given to me by my caregivers. 24-Hour Pharmacies        Pharmacy Address Phone Number   [address] ([address]) [place] 1. There is enlargement of the uterus with multiple uterine lesions, most consistent with uterine fibroid disease. 2. Multiple dominant right ovarian cysts as described above. 3. No acute processes discretely noted.            Dictated by: Moe Robledo, DO PELVIC NODES:  Normal.  No adenopathy. PELVIC ORGANS:  There is enlargement of the uterus with numerous myometrial lesions, most consistent with uterine fibroid disease.  The largest fibroid is a low density concerning for central necrosis and measures 3

## (undated) NOTE — ED AVS SNAPSHOT
[facility] Emergency Department    [place]  [address] [address]    Phone:  [phone]    Fax:  [phone]           Aubrie Romerorles   MRN: [id number]    Department:  [facility] Emergency Department   Date of Visit: IF THERE IS ANY CHANGE OR WORSENING OF YOUR CONDITION, CALL YOUR PRIMARY CARE PHYSICIAN AT ONCE OR RETURN IMMEDIATELY TO THE EMERGENCY DEPARTMENT.     If you have been prescribed any medication(s), please fill your prescription right away and begin taking t

## (undated) NOTE — LETTER
DECLINATION FORM    [address] [facility] provides interpreters for patients who are deaf, hearing impaired, or have Limited [place] Proficiency in order to ensure thee patients an equal opportunity to benefit from services.  There Patient Name: Mirian Merino     : 10/12/1970    Page 1 of 1     Printed: @DATE      Medical Record #: [id number]   Revised (03)

## (undated) NOTE — ED AVS SNAPSHOT
Daniel Pena   MRN: [id number]    Department:  [facility] Emergency Department   Date of Visit:  3/17/2018           Disclosure     Insurance plans vary and the physician(s) referred by the ER may not be covered by your plan.  Please conta tell this physician (or your personal doctor if your instructions are to return to your personal doctor) about any new or lasting problems. The primary care or specialist physician will see patients referred from the [facility] Emergency Department.  Ryan Samuel